# Patient Record
(demographics unavailable — no encounter records)

---

## 2024-10-17 NOTE — ADDENDUM
[FreeTextEntry1] : Entered by Brannon Villavicencio, acting as scribe for Dr. Yifan Whitlock. The documentation recorded by the scribe accurately reflects the service I personally performed and the decisions made by me.

## 2024-10-17 NOTE — LETTER BODY
[FreeTextEntry1] : Jamaica Delgado -[address] [facility] [address] [place], [place] [id number] [phone]  Dear Dr. Delgado,   Reason for visit: Abnormal imaging. Bladder lesion. Gross hematuria.       This is a 83 year-old woman with abnormal abdominal imaging and gross hematuria. Patient reports of gross hematuria since August. Patient underwent CT scan, which demonstrated a 2 cm exophytic lesion in the left side of the urinary bladder. Patient denies any flank pain, hematuria, or urinary difficulties. Patient denies any urinary incontinence. The patient denies any aggravating or relieving factors. The patient denies any interference of function. The patient is entirely asymptomatic. All other review of systems are negative. She has no cancer in her family medical history. She has no previous surgical history. Past medical history, family history and social history were inquired and were noncontributory to current condition. The patient does not use tobacco or drink alcohol. Medications and allergies were reviewed. She has no known allergies to medication.       On examination, the patient is a healthy-appearing woman in no acute distress. She is alert and oriented follows commands. She has normal mood and affect. She is normocephalic. Neck is supple. Oral no thrush. Respirations are unlabored. Abdomen is soft and nontender. Bladder is nonpalpable. No CVA tenderness. Neurologically she is grossly intact. No peripheral edema. Skin without gross abnormality.    I personally reviewed the CT scan with patient today. CT scan demonstrated 2 cm exophytic lesion in the left sdie Left side of theurinary bladder.   Assessment: Abnormal urinary imaging. Bladder lesion. Gross hematuria.       I counseled the patient. Patient has a 2 cm exophytic lesion with gross hematuria.  I recommended she obtain TURBT for excision of her bladder lesion. I counseled the patient regarding the procedure. The risks and benefits were discussed. Alternatives were given. I answered the patient questions. The patient will take the necessary preparations for the procedure, including preop labs with activated partial thromboplastin time, BMP, CBC w/ DIFF, culture, prothrombin TIME and INR. The patient will follow-up as directed and will contact me with any questions or concerns. Thank you for the opportunity to participate in the care of Ms. KESSLER. I will keep you updated on her progress.       Plan: TURBT. Preop labs. Follow-up as directed.

## 2024-10-17 NOTE — HISTORY OF PRESENT ILLNESS
[FreeTextEntry1] : New gross hematuria since Aug 2024, patient denied any other symptom that associated with gross hematuria, CT scan done demonstrated bladder mass  I personally reviewed the CT scan with patient today. CT scan demonstrated 2 cm exophytic lesion in the left sdie Left side of theurinary bladder.  plan TURBT. Preop labs.  Please refer to URO Consult Note.

## 2024-10-17 NOTE — LETTER BODY
[FreeTextEntry1] : Jamaica Delgado -[address] [facility] [address] [place], [place] [id number] [phone]  Dear Dr. Delgado,   Reason for visit: Abnormal imaging. Bladder lesion. Gross hematuria.       This is a 83 year-old woman with abnormal abdominal imaging and gross hematuria. Patient reports of gross hematuria since August. Patient underwent CT scan, which demonstrated a 2 cm exophytic lesion in the left side of the urinary bladder. Patient denies any flank pain, hematuria, or urinary difficulties. Patient denies any urinary incontinence. The patient denies any aggravating or relieving factors. The patient denies any interference of function. The patient is entirely asymptomatic. All other review of systems are negative. She has no cancer in her family medical history. She has no previous surgical history. Past medical history, family history and social history were inquired and were noncontributory to current condition. The patient does not use tobacco or drink alcohol. Medications and allergies were reviewed. She has no known allergies to medication.       On examination, the patient is a healthy-appearing woman in no acute distress. She is alert and oriented follows commands. She has normal mood and affect. She is normocephalic. Neck is supple. Oral no thrush. Respirations are unlabored. Abdomen is soft and nontender. Bladder is nonpalpable. No CVA tenderness. Neurologically she is grossly intact. No peripheral edema. Skin without gross abnormality.    I personally reviewed the CT scan with patient today. CT scan demonstrated 2 cm exophytic lesion in the left sdie Left side of theurinary bladder.   Assessment: Abnormal urinary imaging. Bladder lesion. Gross hematuria.       I counseled the patient. Patient has a 2 cm exophytic lesion with gross hematuria.  I recommended she obtain TURBT for excision of her bladder lesion. I counseled the patient regarding the procedure. The risks and benefits were discussed. Alternatives were given. I answered the patient questions. The patient will take the necessary preparations for the procedure, including preop labs with activated partial thromboplastin time, BMP, CBC w/ DIFF, culture, prothrombin TIME and INR. The patient will follow-up as directed and will contact me with any questions or concerns. Thank you for the opportunity to participate in the care of Ms. KESSLER. I will keep you updated on her progress.       Plan: TURBT. Preop labs. Follow-up as directed.

## 2024-10-24 NOTE — LETTER BODY
[FreeTextEntry1] : Jamaica Delgado -[address] [facility] [address] [place], [place] [id number] [phone]  Dear Dr. Delgado,   Reason for visit: Bladder lesion s/p TURBT. Gross hematuria.    This is a 83 year-old woman with bladder lesion status post TURBT and gross hematuria. Patient reports of gross hematuria since August. Patient underwent CT scan, which demonstrated a 2 cm exophytic lesion in the left side of the urinary bladder. Patient is status post TURBT. Her pathology report is pending. Patient returns today for follow-up and voiding trial. Patient passed her voiding trial today. Since she was last seen, patient denies any flank pain, hematuria, or urinary difficulties. Patient denies any urinary incontinence. The patient denies any aggravating or relieving factors. The patient denies any interference of function. The patient is entirely asymptomatic. All other review of systems are negative. She has no cancer in her family medical history. She has no previous surgical history. Past medical history, family history and social history were inquired and were noncontributory to current condition. The patient does not use tobacco or drink alcohol. Medications and allergies were reviewed. She has no known allergies to medication.    On examination, the patient is a healthy-appearing woman in no acute distress. She is alert and oriented follows commands. She has normal mood and affect. She is normocephalic. Neck is supple. Oral no thrush. Respirations are unlabored. Abdomen is soft and nontender. Bladder is nonpalpable. No CVA tenderness. Neurologically she is grossly intact. No peripheral edema. Skin without gross abnormality.    Patient was given a voiding trial today. The patient's bladder was filled with 300  cc of water. Patient was able to void spontaneously.    Assessment: Bladder lesion s/p TURBT. Gross hematuria.    I counseled the patient. In terms of her bladder lesion, patient is status post TURBT for her bladder lesion. The patient denied any difficulties or interval complaints. I reassured the patient. She passed her voiding trial today. Her pathology report is still pending. Patient will await final pathology report and follow-up to discuss the results. In terms of her gross hematuria, her urine labs were unremarkable. The patient will follow-up as directed and will contact me with any questions or concerns. Thank you for the opportunity to participate in the care of Ms. KESSLER. I will keep you updated on her progress.    Plan: Await final pathology report. Follow-up as directed.

## 2024-10-24 NOTE — LETTER BODY
[FreeTextEntry1] : Jamaica Delgado -[address] [facility] [address] [place], [place] [id number] [phone]  Dear Dr. Delgado,   Reason for visit: Bladder lesion s/p TURBT. Gross hematuria.    This is a 83 year-old woman with bladder lesion status post TURBT and gross hematuria. Patient reports of gross hematuria since August. Patient underwent CT scan, which demonstrated a 2 cm exophytic lesion in the left side of the urinary bladder. Patient is status post TURBT. Her pathology report is pending. Patient returns today for follow-up and voiding trial. Patient passed her voiding trial today. Since she was last seen, patient denies any flank pain, hematuria, or urinary difficulties. Patient denies any urinary incontinence. The patient denies any aggravating or relieving factors. The patient denies any interference of function. The patient is entirely asymptomatic. All other review of systems are negative. She has no cancer in her family medical history. She has no previous surgical history. Past medical history, family history and social history were inquired and were noncontributory to current condition. The patient does not use tobacco or drink alcohol. Medications and allergies were reviewed. She has no known allergies to medication.    On examination, the patient is a healthy-appearing woman in no acute distress. She is alert and oriented follows commands. She has normal mood and affect. She is normocephalic. Neck is supple. Oral no thrush. Respirations are unlabored. Abdomen is soft and nontender. Bladder is nonpalpable. No CVA tenderness. Neurologically she is grossly intact. No peripheral edema. Skin without gross abnormality.    Patient was given a voiding trial today. The patient's bladder was filled with 300  cc of water. Patient was able to void spontaneously.    Assessment: Bladder lesion s/p TURBT. Gross hematuria.    I counseled the patient. In terms of her bladder lesion, patient is status post TURBT for her bladder lesion. The patient denied any difficulties or interval complaints. I reassured the patient. She passed her voiding trial today. Her pathology report is still pending. Patient will await final pathology report and follow-up to discuss the results. In terms of her gross hematuria, her urine labs were unremarkable. The patient will follow-up as directed and will contact me with any questions or concerns. Thank you for the opportunity to participate in the care of Ms. KESSLER. I will keep you updated on her progress.    Plan: Await final pathology report. Follow-up as directed.

## 2024-10-24 NOTE — HISTORY OF PRESENT ILLNESS
[FreeTextEntry1] : Follow-up bladder cancer.  Patient status post TURBT.  Patient was given a voiding trial today. The patient's bladder was filled with 300 cc of water. Patient was able to void spontaneously.  Plan: Await final pathology report.  Follow-up with  Please refer to URO Consult Note.

## 2024-11-11 NOTE — LETTER BODY
[FreeTextEntry1] : Jamaica Delgado -[address] [facility] [address] [place], [place] [id number] [phone]  Dear Dr. Delgado,   Reason for visit: Bladder cancer s/p TURBT. Gross hematuria.    This is a 83 year-old woman with bladder lesion status post TURBT and gross hematuria. Patient reports of gross hematuria since August. Patient underwent CT scan, which demonstrated a 2 cm exophytic lesion in the left side of the urinary bladder. Patient is status post TURBT. Her pathology report is pending. Patient passed her voiding trial previously. Patient returns today for follow-up. Since she was last seen, pathology demonstrated evidence of T1 high-grade bladder cancer. There is no evidence of muscle invasion. Patient denies any flank pain, hematuria, or urinary difficulties. Patient denies any urinary incontinence. The patient denies any aggravating or relieving factors. The patient denies any interference of function. The patient is entirely asymptomatic. All other review of systems are negative. She has no cancer in her family medical history. She has no previous surgical history. Past medical history, family history and social history were inquired and were noncontributory to current condition. The patient does not use tobacco or drink alcohol. Medications and allergies were reviewed. She has no known allergies to medication.    On examination, the patient is a healthy-appearing woman in no acute distress. She is alert and oriented follows commands. She has normal mood and affect. She is normocephalic. Neck is supple. Oral no thrush. Respirations are unlabored. Abdomen is soft and nontender. Bladder is nonpalpable. No CVA tenderness. Neurologically she is grossly intact. No peripheral edema. Skin without gross abnormality.    Pathology demonstrated evidence of T1 high-grade bladder cancer. There is no evidence of muscle invasion.    Assessment: Bladder lesion s/p TURBT. Gross hematuria.     I counseled the patient on its clinical significance. I discussed the risk of occurrence and progression. I recommended the patient consider intravesical therapy with BCG for six weeks. I counseled the patient regarding the procedure. The risks and benefits were discussed. Alternatives were given. I answered the patient's questions. The patient will take the necessary preparations for the procedure. The patient will follow-up as directed and will contact me with any questions or concerns. Thank you for the opportunity to participate in the care of this patient. I'll keep you updated on Her progress.   Plan: Intravesical therapy with BCG. Follow-up as directed.

## 2024-11-11 NOTE — HISTORY OF PRESENT ILLNESS
[FreeTextEntry1] : s/p bladder surgery on 10/23/2024, here to review pathology report. Patient is doing well post-op, denied any pain or discomfort, denied blood in urine  Pathology there was T1 high-grade bladder cancer.  There is no evidence of muscle invasion.  Recommendation patient proceed with 6-week intravesical therapy with BCG.  I counseled the patient regarding the procedure. The risks and benefits were discussed. Alternatives were given. I answered the patient questions. The patient will take the necessary preparations for the procedure. The patient will follow-up as directed and will contact me with any questions or concerns.   Please refer to URO Consult Note.

## 2024-12-09 NOTE — ADDENDUM
[FreeTextEntry1] : This note was partly authored by Celio Gottlieb working as a scribe for [employer]. I, [employer] Man, have reviewed the content of this note and confirm it is true and accurate. I personally performed the history and physical examination and made all the decisions. 12/09/2024.

## 2024-12-09 NOTE — HISTORY OF PRESENT ILLNESS
[FreeTextEntry1] : 12/09/2024 12/09/2024: Ms. KESSLER is a 83 year-old female, patient of Dr. Yifan Whitlock  s/p TURBT on 10/23/2024 for T1 Invasive and High-Grade urothelial carcinoma (Muscularis propria  involvement indeterminate)  Here today for # 2/6 BCG bladder instillation today.  Patient denies temperature elevation or hematuria since last was here.   Catheter inserted and attached to inverted leg bag due to frequency and urgency.

## 2025-01-01 NOTE — ADDENDUM
[FreeTextEntry1] : This note was partly authored by Celio Gottlieb working as a scribe for [employer]. I, [employer] Man, have reviewed the content of this note and confirm it is true and accurate. I personally performed the history and physical examination and made all the decisions. 12/30/2024.

## 2025-01-01 NOTE — HISTORY OF PRESENT ILLNESS
[FreeTextEntry1] : 12/30/2024: Ms. KESSLER is a 83 year-old female, patient of Dr. Yifan Whitlock  s/p TURBT on 10/23/2024 for T1 Invasive and High-Grade urothelial carcinoma (Muscularis propria involvement indeterminate)  Here today for # 5/6 BCG bladder instillation today.  Patient denies temperature elevation or hematuria since last was here.

## 2025-01-01 NOTE — ASSESSMENT
[FreeTextEntry1] : BCG administered by Nurse PO, IP with clinician verification  Patient remained in treatment room to hold instillation   The patient did not experience any complications. the patient tolerated the procedure well. instructions and information given and reviewed with patient.   follow up for #6/6 BCG instillation.